# Patient Record
Sex: FEMALE | Race: OTHER
[De-identification: names, ages, dates, MRNs, and addresses within clinical notes are randomized per-mention and may not be internally consistent; named-entity substitution may affect disease eponyms.]

---

## 2018-06-09 ENCOUNTER — HOSPITAL ENCOUNTER (INPATIENT)
Dept: HOSPITAL 62 - LC | Age: 28
LOS: 3 days | Discharge: HOME | End: 2018-06-12
Attending: OBSTETRICS & GYNECOLOGY | Admitting: OBSTETRICS & GYNECOLOGY
Payer: COMMERCIAL

## 2018-06-09 DIAGNOSIS — O26.893: ICD-10-CM

## 2018-06-09 DIAGNOSIS — Z3A.39: ICD-10-CM

## 2018-06-09 DIAGNOSIS — Z67.41: ICD-10-CM

## 2018-06-09 DIAGNOSIS — Z86.19: ICD-10-CM

## 2018-06-09 LAB
APPEARANCE UR: CLEAR
APTT PPP: YELLOW S
BARBITURATES UR QL SCN: NEGATIVE
BILIRUB UR QL STRIP: NEGATIVE
GLUCOSE UR STRIP-MCNC: NEGATIVE MG/DL
KETONES UR STRIP-MCNC: NEGATIVE MG/DL
METHADONE UR QL SCN: NEGATIVE
NITRITE UR QL STRIP: NEGATIVE
PCP UR QL SCN: NEGATIVE
PH UR STRIP: 7 [PH] (ref 5–9)
PROT UR STRIP-MCNC: NEGATIVE MG/DL
SP GR UR STRIP: 1.01
URINE AMPHETAMINES SCREEN: NEGATIVE
URINE BENZODIAZEPINES SCREEN: NEGATIVE
URINE COCAINE SCREEN: NEGATIVE
URINE MARIJUANA (THC) SCREEN: NEGATIVE
UROBILINOGEN UR-MCNC: NEGATIVE MG/DL (ref ?–2)

## 2018-06-09 PROCEDURE — 85025 COMPLETE CBC W/AUTO DIFF WBC: CPT

## 2018-06-09 PROCEDURE — 86850 RBC ANTIBODY SCREEN: CPT

## 2018-06-09 PROCEDURE — 81001 URINALYSIS AUTO W/SCOPE: CPT

## 2018-06-09 PROCEDURE — 86592 SYPHILIS TEST NON-TREP QUAL: CPT

## 2018-06-09 PROCEDURE — 80307 DRUG TEST PRSMV CHEM ANLYZR: CPT

## 2018-06-09 PROCEDURE — 86870 RBC ANTIBODY IDENTIFICATION: CPT

## 2018-06-09 PROCEDURE — 36415 COLL VENOUS BLD VENIPUNCTURE: CPT

## 2018-06-09 PROCEDURE — 85461 HEMOGLOBIN FETAL: CPT

## 2018-06-09 PROCEDURE — 86901 BLOOD TYPING SEROLOGIC RH(D): CPT

## 2018-06-09 PROCEDURE — 85027 COMPLETE CBC AUTOMATED: CPT

## 2018-06-09 PROCEDURE — 86900 BLOOD TYPING SEROLOGIC ABO: CPT

## 2018-06-10 LAB
ADD MANUAL DIFF: (no result)
ANISOCYTOSIS BLD QL SMEAR: (no result)
BASOPHILS # BLD AUTO: 0.1 10^3/UL (ref 0–0.2)
BASOPHILS NFR BLD AUTO: 0.4 % (ref 0–2)
EOSINOPHIL # BLD AUTO: 0.2 10^3/UL (ref 0–0.6)
EOSINOPHIL NFR BLD AUTO: 1.2 % (ref 0–6)
ERYTHROCYTE [DISTWIDTH] IN BLOOD BY AUTOMATED COUNT: 29.2 % (ref 11.5–14)
HCT VFR BLD CALC: 40.2 % (ref 36–47)
HGB BLD-MCNC: 13.1 G/DL (ref 12–15.5)
LYMPHOCYTES # BLD AUTO: 4.5 10^3/UL (ref 0.5–4.7)
LYMPHOCYTES NFR BLD AUTO: 28 % (ref 13–45)
MCH RBC QN AUTO: 25 PG (ref 27–33.4)
MCHC RBC AUTO-ENTMCNC: 32.6 G/DL (ref 32–36)
MCV RBC AUTO: 77 FL (ref 80–97)
MONOCYTES # BLD AUTO: 1.1 10^3/UL (ref 0.1–1.4)
MONOCYTES NFR BLD AUTO: 6.7 % (ref 3–13)
NEUTROPHILS # BLD AUTO: 10.2 10^3/UL (ref 1.7–8.2)
NEUTS SEG NFR BLD AUTO: 63.7 % (ref 42–78)
PLATELET # BLD: 280 10^3/UL (ref 150–450)
PLATELET COMMENT: ADEQUATE
POLYCHROMASIA BLD QL SMEAR: SLIGHT
RBC # BLD AUTO: 5.23 10^6/UL (ref 3.72–5.28)
TOTAL CELLS COUNTED % (AUTO): 100 %
WBC # BLD AUTO: 16.1 10^3/UL (ref 4–10.5)

## 2018-06-10 PROCEDURE — 0KQM0ZZ REPAIR PERINEUM MUSCLE, OPEN APPROACH: ICD-10-PCS | Performed by: OBSTETRICS & GYNECOLOGY

## 2018-06-10 PROCEDURE — 4A1HXCZ MONITORING OF PRODUCTS OF CONCEPTION, CARDIAC RATE, EXTERNAL APPROACH: ICD-10-PCS | Performed by: OBSTETRICS & GYNECOLOGY

## 2018-06-10 RX ADMIN — OXYCODONE AND ACETAMINOPHEN PRN TAB: 5; 325 TABLET ORAL at 03:57

## 2018-06-10 RX ADMIN — DOCUSATE SODIUM SCH MG: 100 CAPSULE, LIQUID FILLED ORAL at 09:26

## 2018-06-10 RX ADMIN — Medication SCH CAP: at 09:26

## 2018-06-10 RX ADMIN — FERROUS SULFATE TAB 325 MG (65 MG ELEMENTAL FE) SCH MG: 325 (65 FE) TAB at 17:11

## 2018-06-10 RX ADMIN — IBUPROFEN SCH MG: 800 TABLET, FILM COATED ORAL at 03:55

## 2018-06-10 RX ADMIN — IBUPROFEN SCH MG: 800 TABLET, FILM COATED ORAL at 21:40

## 2018-06-10 RX ADMIN — SENNOSIDES, DOCUSATE SODIUM SCH EACH: 50; 8.6 TABLET, FILM COATED ORAL at 09:26

## 2018-06-10 RX ADMIN — IBUPROFEN SCH MG: 800 TABLET, FILM COATED ORAL at 14:03

## 2018-06-10 RX ADMIN — DOCUSATE SODIUM SCH MG: 100 CAPSULE, LIQUID FILLED ORAL at 17:11

## 2018-06-10 RX ADMIN — FERROUS SULFATE TAB 325 MG (65 MG ELEMENTAL FE) SCH MG: 325 (65 FE) TAB at 09:27

## 2018-06-10 NOTE — ADMISSION PHYSICAL
=================================================================



=================================================================

Datetime Report Generated by CPN: 06/10/2018 01:26

   

   

=================================================================

CURRENT ADMISSION

=================================================================

   

Chief Complaint:  Uterine Contractions

Indication for Induction:  Not Applicable

Admit Impression :  Term, Intrauterine Pregnancy; Active Labor; Intact

   Membranes

Admit Plan:  Admit to Unit; Initiate Labor Protocol

   

=================================================================

ALLERGIES

=================================================================

   

Medication Allergies:  No

Medication Allergies:  No Known Allergies (2018)

Latex:  No Latex Allergies

Food Allergies:  denies

Environmental Allergies:  denies

   

=================================================================

OBSTETRICAL HISTORY

=================================================================

   

EDC:  2018 00:00

:  2

Para:  1

Term:  1

Ectopic:  0

Livin

Cesareans:  0

VBACs:  0

Multiple Births:  0

Gestational Diabetes:  No

Rh Sensitization:  No

Incompetent Cervix:  No

YVETTE:  No

Infertility:  No

ART Treatment:  No

Uterine Anomaly:  No

IUGR:  No

Hx Previous C/S:  No

Macrosomia:  No

Hx Loss/Stillborn:  No

PIH:  No

Hx  Death:  No

Placenta Previa/Abruption:  No

Depression/PP Depression:  No

PTL/PROM:  No

Post Partum Hemorrhage:  No

Current Pregnancy Procedures:  Ultrasound

Obstetrical History Comments:  G1:  male, 8# 8oz, no complications

   G2: current

   

=================================================================

***SEE PRENATAL RECORDS***

=================================================================

   

Alcohol:  No

Marijuana :  No

Cocaine:  No

Other Illicit Drugs:  No

Cigarettes:  Never Smoker. [id number]

   

=================================================================

MEDICAL HISTORY

=================================================================

   

Diabetes:  No

Blood Transfusion:  No

Pulmonary Disease (Asthma, TB):  No

Breast Disease:  No

Hypertension:  No

Gyn Surgery:  No

Heart Disease:  No

Hosp/Surgery:  Yes

Autoimmune Disorder:  No

Anesthetic Complications:  No

Kidney Disease:  No

Abnormal Pap Smear:  No

Neuro/Epilepsy:  No

Psychiatric Disorders:  No

Other Medical Diseases:  No

Hepatitis/Liver Disease:  No

Significant Family History:  No

Varicosities/Phlebitis:  No

Trauma/Violence :  No

Thyroid Dysfunction:  No

Medical History Comments:  jaw sx 

   

=================================================================

INFECTIOUS HISTORY

=================================================================

   

Gonorrhea:  No

Genital Herpes:  No

Chlamydia:  Yes

Tuberculosis:  No

Syphilis:  No

Hepatitis:  No

HIV/AIDS Exposure:  No

Rash or Viral Illness:  No

HPV:  No

Infectious History Comments:  chlamydia 18 with positive RIMA 

   

=================================================================

PHYSICAL EXAM

=================================================================

   

General:  Normal

HEENT:  Normal

Heart:  Normal

Lungs:  Normal

Abdomen:  Normal

Genitourinary Exam:  Normal

Extremities:  Normal

Pelvic Type:  Adequate

Vital Signs:  Reviewed; Within Normal Limits

   

=================================================================

VAGINAL EXAM

=================================================================

   

Dilatation:  8

Effacement:  90

Station:  -1

Contraction Comments:  q 1-3 min

   

=================================================================

MEMBRANES

=================================================================

   

Pooling:  Negative

Membranes:  Intact

   

=================================================================

FETUS A

=================================================================

   

EGA:  37.3

Monitoring:  External US

FHR- Baseline:  135

Variability:  Moderate 6-25bpm

Accelerations:  15X15

Decelerations:  None

FHR Category:  Category I

Fetal Presentation:  Vertex

   

=================================================================

PLANS FOR LABOR AND DELIVERY

=================================================================

   

Labor and Delivery:  None

Pain Management:  Epidural

Feeding Preference:  Breast

Benefit of Breast Feed Discussed:  Yes

Circumcision:  Yes

   

=================================================================

INFORMED CONSENT

=================================================================

   

Signature:  Electronically signed by MD ALBARO Carpio)  on

   6/10/2018 at 01:26  with User ID: [email or web address]

## 2018-06-11 LAB
ERYTHROCYTE [DISTWIDTH] IN BLOOD BY AUTOMATED COUNT: 29.7 % (ref 11.5–14)
HCT VFR BLD CALC: 33.7 % (ref 36–47)
HGB BLD-MCNC: 11.1 G/DL (ref 12–15.5)
MCH RBC QN AUTO: 25.2 PG (ref 27–33.4)
MCHC RBC AUTO-ENTMCNC: 32.9 G/DL (ref 32–36)
MCV RBC AUTO: 77 FL (ref 80–97)
PLATELET # BLD: 254 10^3/UL (ref 150–450)
RBC # BLD AUTO: 4.4 10^6/UL (ref 3.72–5.28)
WBC # BLD AUTO: 13.4 10^3/UL (ref 4–10.5)

## 2018-06-11 RX ADMIN — IBUPROFEN SCH MG: 800 TABLET, FILM COATED ORAL at 13:39

## 2018-06-11 RX ADMIN — SENNOSIDES, DOCUSATE SODIUM SCH EACH: 50; 8.6 TABLET, FILM COATED ORAL at 10:42

## 2018-06-11 RX ADMIN — DOCUSATE SODIUM SCH MG: 100 CAPSULE, LIQUID FILLED ORAL at 17:23

## 2018-06-11 RX ADMIN — FERROUS SULFATE TAB 325 MG (65 MG ELEMENTAL FE) SCH MG: 325 (65 FE) TAB at 17:23

## 2018-06-11 RX ADMIN — IBUPROFEN SCH MG: 800 TABLET, FILM COATED ORAL at 21:38

## 2018-06-11 RX ADMIN — FERROUS SULFATE TAB 325 MG (65 MG ELEMENTAL FE) SCH MG: 325 (65 FE) TAB at 10:42

## 2018-06-11 RX ADMIN — DOCUSATE SODIUM SCH MG: 100 CAPSULE, LIQUID FILLED ORAL at 10:42

## 2018-06-11 RX ADMIN — IBUPROFEN SCH MG: 800 TABLET, FILM COATED ORAL at 07:43

## 2018-06-11 RX ADMIN — Medication SCH CAP: at 10:42

## 2018-06-11 RX ADMIN — OXYCODONE AND ACETAMINOPHEN PRN TAB: 5; 325 TABLET ORAL at 20:13

## 2018-06-11 NOTE — PDOC PROGRESS REPORT
Subjective-OB


Progress Note for:: 06/11/18


Subjective: 





Resting in bed, no c/o, breast/bottle, eayting well, lochia scant





Physical Exam (OB)


Vital Signs: 


 











Temp Pulse Resp BP Pulse Ox


 


 98.1 F   68   20   106/60   98 


 


 06/11/18 08:44  06/11/18 08:44  06/11/18 08:44  06/11/18 08:44  06/11/18 08:44








 Intake & Output











 06/10/18 06/11/18 06/12/18





 06:59 06:59 06:59


 


Intake Total  400 240


 


Balance  400 240


 


Weight 59.2 kg  














- PIH/Pre-Eclampsia


Clonus: Negative


Headache: Absent


Epigastric Pain: No


Visual Changes: No





- Lochia


Lochia Amount: Scant < 10 ml


Lochia Color: Rubra/Red





- Abdomen


Description: Tender, Soft


Hernia Present: No


Fundal Description: Firm, Midline


Fundal Height: u/u - u/2





Objective-Diagnostic


Laboratory: 


 





 06/11/18 06:41 





 











  06/11/18 06/11/18





  06:41 07:14


 


WBC  13.4 H 


 


RBC  4.40 


 


Hgb  11.1 L 


 


Hct  33.7 L 


 


MCV  77 L 


 


MCH  25.2 L 


 


MCHC  32.9 


 


RDW  29.7 H 


 


Plt Count  254 


 


Blood Type   O NEGATIVE














Assessment and Plan(PN)





- Assessment and Plan


(1) Normal vaginal delivery


Is this a current diagnosis for this admission?: Yes   





(2) Rh negative status during pregnancy


Qualifiers: 


   Trimester: first trimester   Qualified Code(s): O09.891 - Supervision of 

other high risk pregnancies, first trimester; Z67.91 - Unspecified blood type, 

Rh negative; Z67.91 - Unspecified blood type, Rh negative   


Is this a current diagnosis for this admission?: Yes   





- Time Spent with Patient


Time with patient: Less than 15 minutes


Smoking Education Provided: Over 3 minutes


Medications reviewed and adjusted accordingly: Yes





- Disposition


Anticipated Discharge: Home


Within: within 24 hours

## 2018-06-12 VITALS — SYSTOLIC BLOOD PRESSURE: 90 MMHG | DIASTOLIC BLOOD PRESSURE: 43 MMHG

## 2018-06-12 RX ADMIN — SENNOSIDES, DOCUSATE SODIUM SCH EACH: 50; 8.6 TABLET, FILM COATED ORAL at 10:16

## 2018-06-12 RX ADMIN — IBUPROFEN SCH MG: 800 TABLET, FILM COATED ORAL at 14:15

## 2018-06-12 RX ADMIN — FERROUS SULFATE TAB 325 MG (65 MG ELEMENTAL FE) SCH MG: 325 (65 FE) TAB at 10:16

## 2018-06-12 RX ADMIN — Medication SCH CAP: at 10:16

## 2018-06-12 RX ADMIN — IBUPROFEN SCH MG: 800 TABLET, FILM COATED ORAL at 06:22

## 2018-06-12 RX ADMIN — DOCUSATE SODIUM SCH MG: 100 CAPSULE, LIQUID FILLED ORAL at 10:16

## 2018-06-12 NOTE — PDOC DISCHARGE SUMMARY
Final Diagnosis


Discharge Date: 18





- Final Diagnosis


(1) Active labor at term


Is this a current diagnosis for this admission?: Yes   





(2) Chlamydia infection affecting pregnancy


Is this a current diagnosis for this admission?: Yes   





(3) Normal vaginal delivery


Is this a current diagnosis for this admission?: Yes   





(4) Rh negative status during pregnancy


Is this a current diagnosis for this admission?: Yes   





Discharge Data





- Discharge Medication


Prescriptions: 


Docusate Sodium [Colace 100 mg Capsule] 100 mg PO BID #60 capsule


Ibuprofen [Motrin 800 mg Tablet] 800 mg PO Q8 #60 tablet


Home Medications: 








Prenatal Vit/Iron Fum/Folic AC [Prenatal Tablet] 1 each PO DAILY 18 


Docusate Sodium [Colace 100 mg Capsule] 100 mg PO BID #60 capsule 18 


Ibuprofen [Motrin 800 mg Tablet] 800 mg PO Q8 #60 tablet 18 








Gestational Age: 40


Reason(s) for Admission: Onset of Labor


Prenatal Procedures: NST


Intrapartum Procedure(s): Spontaneous Vaginal Delivery


Postpartum Complication(s): Laceration-Vaginal


Laceration-Degree: 2nd





- [place] Data


  ** Baby 1 Male


Home with Mother: Yes


Complications: No





- Diagnosis Test


Laboratory: 


 











Temp Pulse Resp BP Pulse Ox


 


 97.5 F   75   22 H  90/43 L  100 


 


 18 08:53  18 08:53  18 08:53  18 08:53  18 08:53








 











  06/09/18 06/10/18 06/11/18





  22:05 01:02 06:41


 


RBC   5.23  4.40


 


Hgb   13.1  11.1 L


 


Hct   40.2  33.7 L


 


Urine Opiates Screen  NEGATIVE  














- Discharge information/Instructions


Discharge Activity: Activity As Tolerated, Balance Activity w/Rest, Pelvic Rest

, No tub bath


Discharge Diet: Regular


Disposition: HOME, SELF-CARE


Follow up with: [facility]


in: 4, Weeks

## 2018-06-18 NOTE — DELIVERY SUMMARY
=================================================================

Del Sum A-C

=================================================================

Datetime Report Generated by CPN: 2018 18:21

   

   

=================================================================

DELIVERY PERSONNEL

=================================================================

   

DELIVERY PERSONNEL:  [id number]

Delivery Doctor::  Anuja Rayo MD

Labor and Delivery Nurse::  Melissa Brown RN

Labor and Delivery Nurse::  Heather Randhawa RN

Nursery Nurse::  Aditi Landin RN

Scrub Tech/CNA:  Yaz Mcdonough, ST

   

=================================================================

MATERNAL INFORMATION

=================================================================

   

Delivery Anesthesia:  None

Medications After Delivery:  Pitocin Bolus-Please Comment

Maternal Complications:  Precipitous Labor (<3hrs)

   

=================================================================

LABOR SUMMARY

=================================================================

   

EDC:  2018 00:00

No. Babies in Womb:  1

 Attempted:  No

Labor Anesthesia:  None

   

=================================================================

LABOR INFORMATION

=================================================================

   

Reason for Induction:  Not Applicable

Onset of Labor:  06/10/2018 00:29

Complete Dilatation:  06/10/2018 02:03

Oxytocin:  N/A

Group B Beta Strep:  negative

Antibiotics # of Doses:  0

Antibiotics Time of Last Dose:  n/a

Steroids Given:  None

Reason Steroids Not Administered:  Not Applicable

   

=================================================================

MEMBRANES

=================================================================

   

Membranes Rupture Method:  Artificial

Rupture of Membranes:  06/10/2018 01:20

Length of Rupture (hr):  1.33

Amniotic Fluid Color:  Clear

Amniotic Fluid Amount:  Moderate

Amniotic Fluid Odor:  Normal

   

=================================================================

STAGES OF LABOR

=================================================================

   

Stage 1 hr:  1

Stage 1 min:  34

Stage 2 hr:  0

Stage 2 min:  37

Stage 3 hr:  0

Stage 3 min:  5

Total Time in Labor hr:  2

Total Time in Labor min:  16

   

=================================================================

VAGINAL DELIVERY

=================================================================

   

Episiotomy:  None

Laceration #1:  Vaginal

Laceration Extension #1:  Second Degree

Laceration Repair:  Yes

Laceration Repair Note:  Vaginal mucosa repaired in running locked

   fashion with 2.0 Vicryl.  Skin reapproximated in subcuticular

   fashion with 3.0 Vicryl.

Sponge Count Correct:  N/A

Sharps Count Correct:  N/A

   

=================================================================

CSECTION DELIVERY

=================================================================

   

Primary Indication:  N/A

Secondary Indication:  N/A

CSection Incidence:  N/A

Labor:  N/A

Elective:  N/A

CSection Incision:  N/A

   

=================================================================

BABY A INFORMATION

=================================================================

   

Infant Delivery Date/Time:  06/10/2018 02:40

Method of Delivery:  Vaginal

Method of Delivery:  Vaginal

Born in Route :  No

:  N/A

Forceps:  N/A

Vacuum Extraction:  Successful

Shoulder Dystocia :  No

   

=================================================================

ASSISTED DELIVERY BABY A

=================================================================

   

Indication for Assisted Delivery:  Maternal effort and NRFHTs

Catheter Prior to Procedure:  n/a

Vacuum Number of Pulls:  2

Vacuum Number of PopOffs:  1

Vacuum Maximum Pressure Obtained:  In the green

Reduce Pressure btwn Ctx:  Yes

Vacuum :  KIWI

Total Time Vacuum Applied:  60 seconds

   

=================================================================

PRESENTATION/POSITION BABY A

=================================================================

   

Presentation:  Cephalic

Cephalic Presentation:  Vertex

Vertex Position:  Right Occipital Anterior

Breech Presentation:  N/A

   

=================================================================

PLACENTA INFORMATION BABY A

=================================================================

   

Placenta Delivery Time :  06/10/2018 02:45

Placenta Method of Delivery:  Spontaneous

Placenta Status:  Delivered

   

=================================================================

APGAR SCORES BABY A

=================================================================

   

Heart Rate 1 min:  >100 bpm

Resp Effort 1 min:  Good Cry

Reflex Irritability 1 min:  Cough or Sneeze or Pulls Away

Muscle Tone 1 min:  Active Motion

Color 1 min:  Body Pink, Extremities Blue

Resuscitation Effort 1 min:  Oxygen

APGAR SCORE 1 MIN:  9

Heart Rate 5 min:  >100 bpm

Resp Effort 5 min:  Good Cry

Reflex Irritability 5 min:  Cough or Sneeze or Pulls Away

Muscle Tone 5 min:  Active Motion

Color 5 min:  Body Pink, Extremities Blue

Resuscitation Effort 5 min:  Tactile Stimulation

APGAR SCORE 5 MIN:  9

   

=================================================================

INFANT INFORMATION BABY A

=================================================================

   

Gestational Age at Delivery:  37.3

Gestational Status:  Early Term- 37- 38.6 Weeks

Infant Outcome :  Liveborn

Infant Condition :  Stable

Infant Sex:  Male

Infant Sex:  Male

   

=================================================================

IDENTIFICATION BABY A

=================================================================

   

Infant Verification Date/Time:  06/10/2018 02:52

ID Band Number:  [id number]

Mother's Name Verified:  Yes

Infant Medical Record Number:  [id number]

RN Verifying Infant:  JACKSONReta Randhawa RN

Additional Verifying Personnel:  KENNETH Talbot RN

   

=================================================================

WEIGHT/LENGTH BABY A

=================================================================

   

Infant Birthweight (gm):  2840

Infant Weight (lb):  6

Infant Weight (oz):  4

Infant Length (in):  20.00

Infant Length (cm):  50.80

   

=================================================================

CORD INFORMATION BABY A

=================================================================

   

No. Cord Vessels:  3

Nuchal Cord :  N/A

Cord Blood Taken:  Yes-For Eval (Mom's Blood Type - or O+)

Infant Suction:  None

   

=================================================================

ASSESSMENT BABY A

=================================================================

   

Infant Complications:  None

Physical Findings at Delivery:  Within Normal Limits

Physical Findings- Other:  initial assessment to be performed by

   nursery nurse who is at bedside

Infant Respirations:  Appears Normal

Skin to Skin:  No

Neonatologist/ALS Called :  No

Infant Care By:  LURDES Landin

Transferred To:  Remains with Mother

   

=================================================================

BABY B INFORMATION

=================================================================

   

 :  N/A

   

=================================================================

SIGNATURES

=================================================================

   

Signature:  Electronically signed by Anuja Rayo MD (ROSHAN)  on

   6/10/2018 at 03:21  with User ID: [email or web address]

:  I was personally available for consultation and serving as

   supervising physician for the MLP.